# Patient Record
Sex: FEMALE | Race: WHITE | Employment: PART TIME | URBAN - METROPOLITAN AREA
[De-identification: names, ages, dates, MRNs, and addresses within clinical notes are randomized per-mention and may not be internally consistent; named-entity substitution may affect disease eponyms.]

---
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
## 2022-01-04 NOTE — PROGRESS NOTES
Annual exam ages 21-44    Jossy Anderson is a No obstetric history on file. ,  25 y.o. female [address] [facility] [place] Patient's last menstrual period was 12/31/2021., who presents for her annual checkup. Working at [employer]. Applying for job at [employer]. Since her last annual GYN exam about one year ago, she has had the following changes in her health history:   - none    Seen 10/1/21 for dysmenorrhea. US at that visit wnl, was just starting [place]. Discussed trial of Orilissa if not improved. ADDITIONAL CONCERNS:  She is having no significant problems. With regard to the Gardasil vaccine, she is not sure but will check with mom. .      Menstrual status:    Her periods are normally heavy in flow. She just had the first period since being on seasonique and she described it as strange. She had dysmenorrhea. She passed a \"ball of tissue\" and after that, she had no pain. Has picture, looks like decidual cast.    She has premenstrual symptoms. Contraception:    The current method of family planning is abstinence. Sexual history:     She  reports never being sexually active. .        Pap and Mammogram History:    She is not of age for pap smear. The patient has never had a mammogram.    Breast Cancer History/Substance Abuse:    Past Medical History:   Diagnosis Date    Adverse effect of anesthesia     mother is slow to wake, sister became 'hysterical'    Dysmenorrhea 3/1/16    Environmental allergies      Past Surgical History:   Procedure Laterality Date    HX CYST REMOVAL  2019     OB History   No obstetric history on file. Current Outpatient Medications   Medication Sig Dispense Refill    L-Norgest&E Estradiol-E Estrad (Seasonique) 0.15 mg-30 mcg (84)/10 mcg (7) 3MPk Take 1 Tablet by mouth daily. 1 Dose Pack 4     Allergies: Patient has no known allergies.    Social History     Socioeconomic History    Marital status: SINGLE     Spouse name: Not on file    Number of children: Not on file    Years of education: Not on file    Highest education level: Not on file   Occupational History    Not on file   Tobacco Use    Smoking status: Never Smoker    Smokeless tobacco: Never Used   Vaping Use    Vaping Use: Never used   Substance and Sexual Activity    Alcohol use: No    Drug use: No    Sexual activity: Never   Other Topics Concern    Not on file   Social History Narrative    Not on file     Social Determinants of Health     Financial Resource Strain:     Difficulty of Paying Living Expenses: Not on file   Food Insecurity:     Worried About Running Out of Food in the Last Year: Not on file    Tonya of Food in the Last Year: Not on file   Transportation Needs:     Lack of Transportation (Medical): Not on file    Lack of Transportation (Non-Medical): Not on file   Physical Activity:     Days of Exercise per Week: Not on file    Minutes of Exercise per Session: Not on file   Stress:     Feeling of Stress : Not on file   Social Connections:     Frequency of Communication with Friends and Family: Not on file    Frequency of Social Gatherings with Friends and Family: Not on file    Attends Hinduism Services: Not on file    Active Member of [address] [place] [address] or Organizations: Not on file    Attends Club or Organization Meetings: Not on file    Marital Status: Not on file   Intimate Partner Violence:     Fear of Current or Ex-Partner: Not on file    Emotionally Abused: Not on file    Physically Abused: Not on file    Sexually Abused: Not on file   Housing Stability:     Unable to Pay for Housing in the Last Year: Not on file    Number of [place] in the Last Year: Not on file    Unstable Housing in the Last Year: Not on file     Tobacco History:  reports that she has never smoked. She has never used smokeless tobacco.  Alcohol Abuse:  reports no history of alcohol use. Drug Abuse:  reports no history of drug use. There is no problem list on file for this patient.     Family History   Problem Relation Age of Onset    Other Mother         prediabetes    Diabetes Maternal Grandmother     Breast Cancer Paternal Grandmother 79    Pulmonary Embolism Paternal Grandmother         ? PE -- had blood clot, thinks it went to her lungs    Heart Attack Paternal Uncle     Heart Attack Paternal Uncle     Colon Cancer Paternal Grandfather 79       Review of Systems - History obtained from the patient  Constitutional: negative for weight loss, fever, night sweats  HEENT: negative for hearing loss, earache, congestion, snoring, sorethroat  CV: negative for chest pain, palpitations, edema  Resp: negative for cough, shortness of breath, wheezing  GI: negative for change in bowel habits, abdominal pain, black or bloody stools  : negative for frequency, dysuria, hematuria, vaginal discharge  MSK: negative for back pain, joint pain, muscle pain  Breast: negative for breast lumps, nipple discharge, galactorrhea  Skin :negative for itching, rash, hives  Neuro: negative for dizziness, headache, confusion, weakness  Psych: negative for anxiety, depression, change in mood  Heme/lymph: negative for bleeding, bruising, pallor    Physical Exam    Visit Vitals  /77   Pulse 80   Ht 5' 3\" (1.6 m)   Wt 109 lb (49.4 kg)   LMP 12/31/2021   BMI 19.31 kg/m²       Constitutional  · Appearance: well-nourished, well developed, alert, in no acute distress    HENT  · Head and Face: appears normal    Neck  · Inspection/Palpation: normal appearance, no masses or tenderness  · Lymph Nodes: no lymphadenopathy present  · Thyroid: gland size mildly enlarged, nontender, no nodules or masses present on palpation    Chest  · Respiratory Effort: breathing unlabored  · Auscultation: normal breath sounds    Cardiovascular  · Heart:  · Auscultation: regular rate and rhythm without murmur    Breasts  · Inspection of Breasts: breasts symmetrical, no skin changes, no discharge present, nipple appearance normal, no skin retraction present  · Palpation of Breasts and Axillae: no masses present on palpation, no breast tenderness  · Axillary Lymph Nodes: no lymphadenopathy present    Gastrointestinal  · Abdominal Examination: abdomen non-tender to palpation, normal bowel sounds, no masses present  · Liver and spleen: no hepatomegaly present, spleen not palpable  · Hernias: no hernias identified    Genitourinary  [deferred - not yet SA]    Skin  · General Inspection: no rash, no lesions identified    Neurologic/Psychiatric  · Mental Status:  · Orientation: grossly oriented to person, place and time  · Mood and Affect: mood normal, affect appropriate        Assessment & Plan:  · Routine gynecologic examination. Pap @ 22yo. · STI screening <26yo. · Dysmenorrhea, menorrhagia, improved on Seasonique. Passed decidual cast.  Wishes to continue. eRX #1 RFx4   · Mild TM -> TSH, FT4, TPO Ab, thyroid US. · Counseled re: diet, exercise, healthy lifestyle  · Return for yearly wellness visits  · Patient verbalized understanding    Orders Placed This Encounter    US THYROID/PARATHYROID/SOFT TISS     Standing Status:   Future     Standing Expiration Date:   2/6/2023     Order Specific Question:   Is Patient Pregnant? Answer:   Yes     Order Specific Question:   Reason for Exam     Answer:   thyromegaly    TSH 3RD GENERATION     Standing Status:   Future     Standing Expiration Date:   1/6/2023    T4, FREE     Standing Status:   Future     Standing Expiration Date:   1/6/2023    THYROID PEROXIDASE (TPO) AB     Standing Status:   Future     Standing Expiration Date:   1/6/2023    L-Norgest&E Estradiol-E Estrad (Seasonique) 0.15 mg-30 mcg (84)/10 mcg (7) 3MPk     Sig: Take 1 Tablet by mouth daily.      Dispense:  1 Dose Pack     Refill:  4

## 2022-01-06 ENCOUNTER — OFFICE VISIT (OUTPATIENT)
Dept: OBGYN CLINIC | Age: 22
End: 2022-01-06
Payer: COMMERCIAL

## 2022-01-06 VITALS
DIASTOLIC BLOOD PRESSURE: 77 MMHG | SYSTOLIC BLOOD PRESSURE: 126 MMHG | BODY MASS INDEX: 19.31 KG/M2 | WEIGHT: 109 LBS | HEIGHT: 63 IN | HEART RATE: 80 BPM

## 2022-01-06 DIAGNOSIS — N94.6 DYSMENORRHEA: ICD-10-CM

## 2022-01-06 DIAGNOSIS — E01.0 THYROMEGALY: ICD-10-CM

## 2022-01-06 DIAGNOSIS — Z01.419 ENCOUNTER FOR GYNECOLOGICAL EXAMINATION WITHOUT ABNORMAL FINDING: Primary | ICD-10-CM

## 2022-01-06 DIAGNOSIS — Z30.41 ENCOUNTER FOR SURVEILLANCE OF CONTRACEPTIVE PILLS: ICD-10-CM

## 2022-01-06 DIAGNOSIS — N92.0 MENORRHAGIA WITH REGULAR CYCLE: ICD-10-CM

## 2022-01-06 PROCEDURE — 99395 PREV VISIT EST AGE 18-39: CPT | Performed by: OBSTETRICS & GYNECOLOGY

## 2022-01-06 RX ORDER — LEVONORGESTREL / ETHINYL ESTRADIOL AND ETHINYL ESTRADIOL 150-30(84)
1 KIT ORAL DAILY
Qty: 1 DOSE PACK | Refills: 4 | Status: SHIPPED | OUTPATIENT
Start: 2022-01-06 | End: 2022-03-25 | Stop reason: SDUPTHER

## 2022-01-07 LAB
T4 FREE SERPL-MCNC: 1.1 NG/DL (ref 0.8–1.5)
TSH SERPL DL<=0.05 MIU/L-ACNC: 1.87 UIU/ML (ref 0.36–3.74)

## 2022-01-08 LAB — THYROPEROXIDASE AB SERPL-ACNC: <8 IU/ML (ref 0–26)

## 2023-06-26 RX ORDER — LEVONORGESTREL AND ETHINYL ESTRADIOL AND ETHINYL ESTRADIOL 150-30(84)
KIT ORAL
Qty: 273 TABLET | OUTPATIENT
Start: 2023-06-26

## 2023-06-27 ENCOUNTER — TELEPHONE (OUTPATIENT)
Age: 23
End: 2023-06-27

## 2023-06-27 RX ORDER — LEVONORGESTREL / ETHINYL ESTRADIOL AND ETHINYL ESTRADIOL 150-30(84)
1 KIT ORAL DAILY
Qty: 1 PACKET | Refills: 0 | Status: SHIPPED | OUTPATIENT
Start: 2023-06-27

## 2023-07-18 ENCOUNTER — OFFICE VISIT (OUTPATIENT)
Age: 23
End: 2023-07-18
Payer: COMMERCIAL

## 2023-07-18 VITALS
BODY MASS INDEX: 23.91 KG/M2 | WEIGHT: 135 LBS | DIASTOLIC BLOOD PRESSURE: 82 MMHG | SYSTOLIC BLOOD PRESSURE: 115 MMHG | HEART RATE: 76 BPM

## 2023-07-18 DIAGNOSIS — N94.6 DYSMENORRHEA, UNSPECIFIED: ICD-10-CM

## 2023-07-18 DIAGNOSIS — Z12.4 PAP SMEAR FOR CERVICAL CANCER SCREENING: ICD-10-CM

## 2023-07-18 DIAGNOSIS — Z01.419 ENCOUNTER FOR WELL WOMAN EXAM: Primary | ICD-10-CM

## 2023-07-18 DIAGNOSIS — Z11.3 SCREEN FOR STD (SEXUALLY TRANSMITTED DISEASE): ICD-10-CM

## 2023-07-18 DIAGNOSIS — N94.819 VULVODYNIA: ICD-10-CM

## 2023-07-18 DIAGNOSIS — N92.0 EXCESSIVE AND FREQUENT MENSTRUATION WITH REGULAR CYCLE: ICD-10-CM

## 2023-07-18 PROCEDURE — 99395 PREV VISIT EST AGE 18-39: CPT | Performed by: OBSTETRICS & GYNECOLOGY

## 2023-07-18 RX ORDER — LEVONORGESTREL / ETHINYL ESTRADIOL AND ETHINYL ESTRADIOL 150-30(84)
1 KIT ORAL DAILY
Qty: 1 PACKET | Refills: 4 | Status: SHIPPED | OUTPATIENT
Start: 2023-07-18

## 2023-07-18 RX ORDER — DULOXETIN HYDROCHLORIDE 20 MG/1
20 CAPSULE, DELAYED RELEASE ORAL DAILY
Qty: 90 CAPSULE | Refills: 1 | Status: SHIPPED | OUTPATIENT
Start: 2023-07-18

## 2023-07-18 NOTE — PROGRESS NOTES
Alvina Borrego is a 26 y.o. female returns for an annual exam     Chief Complaint   Patient presents with    Annual Exam       Patient's last menstrual period was 07/01/2023. Her periods are moderate in flow and usually regular with a 26-32 day interval with 3-7 day duration. She does not have dysmenorrhea. Problems: problems - attempting intercourse and removing tampons very painful  Birth Control: OCP (estrogen/progesterone) and condoms . Today will be her first pap smear.   With regard to the Gardisil vaccine, she has received all 3 injections
tender at vestibule, no inflammatory lesions present, no masses present, no atrophy present          Vagina: normal vaginal vault, no discharge present, no inflammatory lesions present, no masses present  Bladder: non-tender to palpation  Urethra: appears normal  Cervix: normal   Uterus: normal size, shape and consistency  Adnexa: no adnexal tenderness present, no adnexal masses present  Perineum: perineum within normal limits, no evidence of trauma, no rashes or skin lesions present  Anus: anus within normal limits, no hemorrhoids present  Inguinal Lymph Nodes: no lymphadenopathy present    Skin  General Inspection: no rash, no lesions identified    Neurologic/Psychiatric  Mental Status:  Orientation: grossly oriented to person, place and time  Mood and Affect: mood normal, affect appropriate    Assessment & Plan:  Routine gynecologic examination. Pap today  STI screening <26yo. Dysmenorrhea, menorrhagia. Improved on  Seasonique. 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 RTO 2-3 months to reassess, can add topical lidocaine if needed. Counseled re: diet, exercise, healthy lifestyle  Return for yearly wellness visits  Patient verbalized understanding        Orders Placed This Encounter    Chlamydia, Gonorrhea, Trichomoniasis     Standing Status:   Future     Number of Occurrences:   1     Standing Expiration Date:   7/18/2024    Pap Lb, rfx HPV ASCU     Standing Status:   Future     Number of Occurrences:   1     Standing Expiration Date:   7/18/2024     Order Specific Question:   Pap Source? (Required)     Answer:   CERVIX     Order Specific Question:   Pap Source? (Required)     Answer:   ENDOMETRIUM     Order Specific Question:   Pap collection method? (Required     Answer:   brush     Order Specific Question:   Pap collection method? (Required     Answer:   spatula     Order Specific Question:   Menstrual Status ?      Answer:   Having periods [5]     Order

## 2023-07-20 ENCOUNTER — TELEPHONE (OUTPATIENT)
Age: 23
End: 2023-07-20

## 2023-07-20 DIAGNOSIS — E01.0 THYROMEGALY: Primary | ICD-10-CM

## 2023-07-20 NOTE — TELEPHONE ENCOUNTER
Two patient identifiers used      26year old patient last seen in the office on 7/18/2023 for ae    Patient reports 2022 she had thyroid labs done and ultrasound of thyroid order and she did not get it done    Patient is now wanting to get the ultrasound and needs an order       Assessment & Plan:  Routine gynecologic examination. Pap @ 22yo. STI screening <26yo. Dysmenorrhea, menorrhagia, improved on Seasonique. Passed decidual cast.  Wishes to continue. eRX #1 RFx4   Mild TM -> TSH, FT4, TPO Ab, thyroid US.   Counseled re: diet, exercise, healthy lifestyle  Return for yearly wellness visits  Patient verbalized understanding  Ultrasound pended for amend/sign    Please advise    Thank you

## 2023-07-21 LAB
., LABCORP: NORMAL
CYTOLOGIST CVX/VAG CYTO: NORMAL
CYTOLOGY CVX/VAG DOC CYTO: NORMAL
CYTOLOGY CVX/VAG DOC THIN PREP: NORMAL
DX ICD CODE: NORMAL
Lab: NORMAL
OTHER STN SPEC: NORMAL
STAT OF ADQ CVX/VAG CYTO-IMP: NORMAL

## 2023-07-21 NOTE — TELEPHONE ENCOUNTER
This nurse attempted to reach the patient and left a detailed message for the patient that MD has signed the order for her thyroid  ultrasound      Patient was sent a my chart message as well.

## 2023-07-23 LAB
C TRACH RRNA SPEC QL NAA+PROBE: NEGATIVE
N GONORRHOEA RRNA SPEC QL NAA+PROBE: NEGATIVE
T VAGINALIS RRNA SPEC QL NAA+PROBE: NEGATIVE

## 2023-08-04 ENCOUNTER — HOSPITAL ENCOUNTER (OUTPATIENT)
Facility: HOSPITAL | Age: 23
Discharge: HOME | End: 2023-08-04
Attending: OBSTETRICS & GYNECOLOGY
Payer: COMMERCIAL

## 2023-08-04 DIAGNOSIS — E01.0 THYROMEGALY: ICD-10-CM

## 2023-08-04 PROCEDURE — 76536 US EXAM OF HEAD AND NECK: CPT

## 2023-10-18 NOTE — PROGRESS NOTES
Shawn Morillo is a 26 y.o. female presents for a problem visit. Chief Complaint   Patient presents with    vulvodynia     Patient's last menstrual period was 09/28/2023. Birth Control: OCP (estrogen/progesterone). Last Pap: normal obtained 7/2023    The patient is here today to follow up for her vulvodynia. She reports that nothing has changed. She used the lidocaine once and said she could not feel a difference. She reports that the stinging and burning is still present.

## 2023-10-19 ENCOUNTER — OFFICE VISIT (OUTPATIENT)
Age: 23
End: 2023-10-19
Payer: COMMERCIAL

## 2023-10-19 VITALS
HEART RATE: 91 BPM | BODY MASS INDEX: 24.27 KG/M2 | WEIGHT: 137 LBS | SYSTOLIC BLOOD PRESSURE: 124 MMHG | DIASTOLIC BLOOD PRESSURE: 83 MMHG

## 2023-10-19 DIAGNOSIS — N94.819 VULVODYNIA: Primary | ICD-10-CM

## 2023-10-19 DIAGNOSIS — M62.89 PELVIC FLOOR TENSION: ICD-10-CM

## 2023-10-19 DIAGNOSIS — N94.810 VULVAR VESTIBULITIS: ICD-10-CM

## 2023-10-19 PROCEDURE — 99214 OFFICE O/P EST MOD 30 MIN: CPT | Performed by: OBSTETRICS & GYNECOLOGY

## 2023-10-19 RX ORDER — DULOXETIN HYDROCHLORIDE 30 MG/1
30 CAPSULE, DELAYED RELEASE ORAL DAILY
Qty: 90 CAPSULE | Refills: 1 | Status: SHIPPED | OUTPATIENT
Start: 2023-10-19

## 2023-10-19 RX ORDER — ESTRADIOL 0.1 MG/G
CREAM VAGINAL
Qty: 42.5 G | Refills: 3 | Status: SHIPPED | OUTPATIENT
Start: 2023-10-19

## 2023-10-19 NOTE — PROGRESS NOTES
OB/GYN Problem Visit        HPI  Sheryl Chang is a No obstetric history on file. ,  26 y.o. female who presents for a problem visit. Patient's last menstrual period was 2023. LV=23 AE    Vulvodynia/vestibulitis. Was prescribed low dose cymbalta. Subsequently also prescribed topical lidocaine. Tried the lidocaine once with IC, did not help. Does not think the Cymbalta has helped  Wondering about pelvic floor PT    Is able to insert tampons (tolerates smooth applicator), but has pain with removal.            Past Medical History:   Diagnosis Date    Adverse effect of anesthesia     mother is slow to wake, sister became 'hysterical'    Depression 2019    Drug effect     Allergic to prednisone    Dysmenorrhea 3/1/16    Environmental allergies     HPV vaccine counseling     completed series    Routine Papanicolaou smear 2023    normal     Past Surgical History:   Procedure Laterality Date    PILONIDAL CYST EXCISION  2019    WISDOM TOOTH EXTRACTION  2023     OB History    Para Term  AB Living   0 0 0 0 0 0   SAB IAB Ectopic Molar Multiple Live Births   0 0 0 0 0 0       Social History     Occupational History    Not on file   Tobacco Use    Smoking status: Never    Smokeless tobacco: Never   Vaping Use    Vaping Use: Never used   Substance and Sexual Activity    Alcohol use: No    Drug use: No    Sexual activity: Yes     Partners: Male     Birth control/protection: OCP, Condom     Comment: is attempting     Family History   Problem Relation Age of Onset    Colon Cancer Paternal Grandfather 79    Heart Attack Paternal Uncle     Other Mother         prediabetes    Diabetes Mother         Pre diabetes    Diabetes Maternal Grandmother     Mental Illness Maternal Grandmother         Schizophrenia    Stroke Maternal Grandmother     Breast Cancer Paternal Grandmother 79    Pulmonary Embolism Paternal Grandmother         ? PE -- had blood clot, thinks it went to her lungs

## 2023-11-08 ENCOUNTER — HOSPITAL ENCOUNTER (OUTPATIENT)
Facility: HOSPITAL | Age: 23
Setting detail: RECURRING SERIES
Discharge: HOME | End: 2023-11-11
Payer: COMMERCIAL

## 2023-11-08 PROCEDURE — 97535 SELF CARE MNGMENT TRAINING: CPT

## 2023-11-08 PROCEDURE — 97162 PT EVAL MOD COMPLEX 30 MIN: CPT

## 2023-11-08 PROCEDURE — 97112 NEUROMUSCULAR REEDUCATION: CPT

## 2023-11-08 NOTE — THERAPY EVALUATION
Physical Therapy at [facility],   a part of [address] [place] [address], [address] [facility], [facility] [address]  Phone: [phone]  Fax: [phone]     PHYSICAL THERAPY - EVALUATION/PLAN OF CARE NOTE (updated 3/23)    Date: 2023        Patient Name:  Agus Gee :  2000   Medical   Diagnosis:  Vulvodynia [N94.819]  Vulvar vestibulitis [N94.810]  Pelvic floor tension [M62.89] Treatment Diagnosis:  N90.89  OTHER SPECIFIED NONINFLAMMATORY DISORDERS OF VULVA AND PERINEUM and R39.89  Other signs and symptoms of genitourinary system    Referral Source:  Aniket Cuello MD Provider #:  [id number]                Insurance: Payor: Richard Coronel / Plan: Richard Fears / Product Type: *No Product type* /      Patient  verified yes     Visit #   Current  / Total 1 30   Time   In / Out 845a 945a   Total Treatment Time 60   Total Timed Codes 30     SUBJECTIVE  Pain Level (0-10 scale): 0/10 at rest, 7/10 at worst  []constant []intermittent []improving []worsening []no change since onset    Any medication changes, allergies to medications, adverse drug reactions, diagnosis change, or new procedure performed?: [x] No    [] Yes (see summary sheet for update)  Medications: Verified on Patient Summary List    Subjective functional status/changes:       Vaginal penetration has always been painful- tampons (out more than in), speculums, IC. Oral sex and masturbation are not a problem. Unable to have complete IC. No problems with jeans or certain textures. \"Its almost like there's a wall\"    Sometimes feels like she cant fully empty her bladder. Previously has had trouble holding in urine with laughing but not in a couple years. Has BMs every couple days. Some sharp pains with BMs. Reports type 3 stools. Reports straining about 70% of the time. Drinks a glass and a half of water, cranberry juice, lemonade. Eats cereal, small snacks for lunch, takeout for dinner.

## 2023-11-27 ENCOUNTER — HOSPITAL ENCOUNTER (OUTPATIENT)
Facility: HOSPITAL | Age: 23
Setting detail: RECURRING SERIES
Discharge: HOME | End: 2023-11-30
Payer: COMMERCIAL

## 2023-11-27 PROCEDURE — 97140 MANUAL THERAPY 1/> REGIONS: CPT

## 2023-11-27 PROCEDURE — 97110 THERAPEUTIC EXERCISES: CPT

## 2023-11-27 PROCEDURE — 97112 NEUROMUSCULAR REEDUCATION: CPT

## 2023-11-27 NOTE — PROGRESS NOTES
PHYSICAL THERAPY - DAILY TREATMENT NOTE (updated 3/23)    Date: 2023        Patient Name:  Redd Wakefield :  2000   Medical   Diagnosis:  Vulvodynia [N94.819]  Vulvar vestibulitis [N94.810]  Pelvic floor tension [M62.89] Treatment Diagnosis:  N90.89  OTHER SPECIFIED NONINFLAMMATORY DISORDERS OF VULVA AND PERINEUM and R39.89  Other signs and symptoms of genitourinary system    Referral Source:  Cony St MD Insurance:   Payor: Ak[employer] / Plan: Nabil Grills / Product Type: *No Product type* /                   Patient  verified yes     Visit #   Current  / Total 2 30   Time   In / Out 708a 747a   Total Treatment Time 39   Total Timed Codes 39     SUBJECTIVE    Pain Level (0-10 scale): 0    Any medication changes, allergies to medications, adverse drug reactions, diagnosis change, or new procedure performed?: [x] No    [] Yes (see summary sheet for update)  Medications: Verified on Patient Summary List    Subjective functional status/changes:       Trying to increase fiber and water but didn't have as much time with moving homes. Last couple days had more frequent BMs and squatty potty helping. BMs are sometimes easier bowel massage \"felt like it moved some stuff around\". Leaning forward to pee. Used level 4 dilator before IC. Had successful IC with 5/10 pain. Tried check ins but not sure if she's doing it right. OBJECTIVE    Therapeutic Procedures: Tx Min Billable or 1:1 Min (if diff from Tx Min) Procedure, Rationale, Specifics   15  72380 Manual Therapy (timed):  decrease pain and increase tissue extensibility to improve patient's ability to progress to PLOF and address remaining functional goals. The manual therapy interventions were performed at a separate and distinct time from the therapeutic activities interventions .  (see flow sheet as applicable)     Details if applicable:     14  12456 Therapeutic Exercise (timed):  increase ROM, strength, coordination, balance, and

## 2023-12-04 ENCOUNTER — HOSPITAL ENCOUNTER (OUTPATIENT)
Facility: HOSPITAL | Age: 23
Setting detail: RECURRING SERIES
Discharge: HOME | End: 2023-12-07
Payer: COMMERCIAL

## 2023-12-04 PROCEDURE — 97140 MANUAL THERAPY 1/> REGIONS: CPT

## 2023-12-04 PROCEDURE — 97112 NEUROMUSCULAR REEDUCATION: CPT

## 2023-12-04 NOTE — PROGRESS NOTES
PHYSICAL THERAPY - DAILY TREATMENT NOTE (updated 3/23)    Date: 2023        Patient Name:  Shawn Morillo :  2000   Medical   Diagnosis:  Vulvodynia [N94.819]  Vulvar vestibulitis [N94.810]  Pelvic floor tension [M62.89] Treatment Diagnosis:  N90.89  OTHER SPECIFIED NONINFLAMMATORY DISORDERS OF VULVA AND PERINEUM and R39.89  Other signs and symptoms of genitourinary system    Referral Source:  Lisa Joshi MD Insurance:   Payor: Rafaela Layton / Plan: Rafaela Layton / Product Type: *No Product type* /                   Patient  verified yes     Visit #   Current  / Total 3 30   Time   In / Out 706a 750a   Total Treatment Time 44   Total Timed Codes 44     SUBJECTIVE    Pain Level (0-10 scale): 0    Any medication changes, allergies to medications, adverse drug reactions, diagnosis change, or new procedure performed?: [x] No    [] Yes (see summary sheet for update)  Medications: Verified on Patient Summary List    Subjective functional status/changes:       Had some discomfort with IC since last visit then felt better (2/10), then with changing positions burning pain (8/10). Decreased over 30 minute time frame. Used level 4 dilator before IC. Trying to increase fiber and water. Feels like BMs are more frequent and easier hasn't really had to strain. OBJECTIVE    Therapeutic Procedures: Tx Min Billable or 1:1 Min (if diff from Tx Min) Procedure, Rationale, Specifics   34  61062 Manual Therapy (timed):  decrease pain and increase tissue extensibility to improve patient's ability to progress to PLOF and address remaining functional goals. The manual therapy interventions were performed at a separate and distinct time from the therapeutic activities interventions .  (see flow sheet as applicable)     Details if applicable: pelvic exam, sustained hold R OI and layer 1 throughout     29840 Therapeutic Exercise (timed):  increase ROM, strength, coordination, balance, and proprioception to improve

## 2023-12-11 ENCOUNTER — HOSPITAL ENCOUNTER (OUTPATIENT)
Facility: HOSPITAL | Age: 23
Setting detail: RECURRING SERIES
Discharge: HOME | End: 2023-12-14
Payer: COMMERCIAL

## 2023-12-11 PROCEDURE — 97110 THERAPEUTIC EXERCISES: CPT

## 2023-12-11 PROCEDURE — 97112 NEUROMUSCULAR REEDUCATION: CPT

## 2023-12-11 PROCEDURE — 97535 SELF CARE MNGMENT TRAINING: CPT

## 2023-12-11 NOTE — PROGRESS NOTES
PHYSICAL THERAPY - DAILY TREATMENT NOTE (updated 3/23)    Date: 2023        Patient Name:  Rosario Alvarado :  2000   Medical   Diagnosis:  Vulvodynia [N94.819]  Vulvar vestibulitis [N94.810]  Pelvic floor tension [M62.89] Treatment Diagnosis:  N90.89  OTHER SPECIFIED NONINFLAMMATORY DISORDERS OF VULVA AND PERINEUM and R39.89  Other signs and symptoms of genitourinary system    Referral Source:  Nancy Eduardo MD Insurance:   Payor: [employer] / Plan: [employer] / Product Type: *No Product type* /                   Patient  verified yes     Visit #   Current  / Total 4 30   Time   In / Out 713a 621n   Total Treatment Time 32   Total Timed Codes 32     SUBJECTIVE    Pain Level (0-10 scale): 0    Any medication changes, allergies to medications, adverse drug reactions, diagnosis change, or new procedure performed?: [x] No    [] Yes (see summary sheet for update)  Medications: Verified on Patient Summary List    Subjective functional status/changes:       Has not attempted IC since last visit but no soreness after last visit. No problems with BMs since last visit. OBJECTIVE    Therapeutic Procedures: Tx Min Billable or 1:1 Min (if diff from Tx Min) Procedure, Rationale, Specifics     92150 Manual Therapy (timed):  decrease pain and increase tissue extensibility to improve patient's ability to progress to PLOF and address remaining functional goals. The manual therapy interventions were performed at a separate and distinct time from the therapeutic activities interventions . (see flow sheet as applicable)     Details if applicable:    8  74834 Therapeutic Exercise (timed):  increase ROM, strength, coordination, balance, and proprioception to improve patient's ability to progress to PLOF and address remaining functional goals.  (see flow sheet as applicable)     Details if applicable:     10  84546 Neuromuscular Re-Education (timed):  improve balance, coordination, kinesthetic sense,

## 2023-12-18 ENCOUNTER — APPOINTMENT (OUTPATIENT)
Facility: HOSPITAL | Age: 23
End: 2023-12-18
Payer: COMMERCIAL

## 2024-01-05 RX ORDER — DULOXETIN HYDROCHLORIDE 20 MG/1
20 CAPSULE, DELAYED RELEASE ORAL DAILY
Qty: 90 CAPSULE | Refills: 1 | OUTPATIENT
Start: 2024-01-05

## 2024-01-08 ENCOUNTER — HOSPITAL ENCOUNTER (OUTPATIENT)
Facility: HOSPITAL | Age: 24
Setting detail: RECURRING SERIES
Discharge: HOME | End: 2024-01-11
Payer: COMMERCIAL

## 2024-01-08 PROCEDURE — 97112 NEUROMUSCULAR REEDUCATION: CPT

## 2024-01-08 PROCEDURE — 97535 SELF CARE MNGMENT TRAINING: CPT

## 2024-01-08 NOTE — PROGRESS NOTES
PHYSICAL THERAPY - DAILY TREATMENT NOTE (updated 3/23)    Date: 2024        Patient Name:  Sheryl Chang :  2000   Medical   Diagnosis:  Vulvodynia [N94.819]  Vulvar vestibulitis [N94.810]  Pelvic floor tension [M62.89] Treatment Diagnosis:  N90.89  OTHER SPECIFIED NONINFLAMMATORY DISORDERS OF VULVA AND PERINEUM and R39.89  Other signs and symptoms of genitourinary system    Referral Source:  Jaimee Abbott MD Insurance:   Payor: [employer] / Plan: [employer] / Product Type: *No Product type* /                   Patient  verified yes     Visit #   Current  / Total 5 30   Time   In / Out 711a 749a   Total Treatment Time 38   Total Timed Codes 38     SUBJECTIVE    Pain Level (0-10 scale): 0    Any medication changes, allergies to medications, adverse drug reactions, diagnosis change, or new procedure performed?: [x] No    [] Yes (see summary sheet for update)  Medications: Verified on Patient Summary List    Subjective functional status/changes:       Made it to size 4 dilator with 6/10 pain. Had IC on multiple occasions and able to switch positions. Used dilator before. A little pain in the beginning (1/10) with minimal stinging then followed afterwards for about 10 minutes. Stopped estrogen around giovany. Able to do breathing and stretching a couple times per week.     OBJECTIVE    Therapeutic Procedures:  Tx Min Billable or 1:1 Min (if diff from Tx Min) Procedure, Rationale, Specifics     31282 Manual Therapy (timed):  decrease pain and increase tissue extensibility to improve patient's ability to progress to PLOF and address remaining functional goals.  The manual therapy interventions were performed at a separate and distinct time from the therapeutic activities interventions . (see flow sheet as applicable)     Details if applicable:      61870 Therapeutic Exercise (timed):  increase ROM, strength, coordination, balance, and proprioception to improve patient's ability to progress

## 2024-01-22 ENCOUNTER — HOSPITAL ENCOUNTER (OUTPATIENT)
Facility: HOSPITAL | Age: 24
Setting detail: RECURRING SERIES
Discharge: HOME | End: 2024-01-25
Payer: COMMERCIAL

## 2024-01-22 PROCEDURE — 97535 SELF CARE MNGMENT TRAINING: CPT

## 2024-01-22 PROCEDURE — 97140 MANUAL THERAPY 1/> REGIONS: CPT

## 2024-01-22 NOTE — PROGRESS NOTES
PHYSICAL THERAPY - DAILY TREATMENT NOTE (updated 3/23)    Date: 2024        Patient Name:  Sheryl Chang :  2000   Medical   Diagnosis:  Vulvodynia [N94.819]  Vulvar vestibulitis [N94.810]  Pelvic floor tension [M62.89] Treatment Diagnosis:  N90.89  OTHER SPECIFIED NONINFLAMMATORY DISORDERS OF VULVA AND PERINEUM and R39.89  Other signs and symptoms of genitourinary system    Referral Source:  Jaimee Abbott MD Insurance:   Payor: [employer] / Plan: [employer] - CHOICE PLU / Product Type: *No Product type* /                   Patient  verified yes     Visit #   Current  / Total 6 30   Time   In / Out 704a 751a   Total Treatment Time 47   Total Timed Codes 47     SUBJECTIVE    Pain Level (0-10 scale): 0    Any medication changes, allergies to medications, adverse drug reactions, diagnosis change, or new procedure performed?: [x] No    [] Yes (see summary sheet for update)  Medications: Verified on Patient Summary List    Subjective functional status/changes:       Sees an improvement with sex overall but wants to try without numbing cream and Cymbalta. Has not had IC since last visit and has had a hard time doing dilators. Still on size 4 dilator and it feels the same.     OBJECTIVE    Therapeutic Procedures:  Tx Min Billable or 1:1 Min (if diff from Tx Min) Procedure, Rationale, Specifics     32  93524 Manual Therapy (timed):  decrease pain and increase tissue extensibility to improve patient's ability to progress to PLOF and address remaining functional goals.  The manual therapy interventions were performed at a separate and distinct time from the therapeutic activities interventions . (see flow sheet as applicable)     Details if applicable: size 4 IR dilator, R/L stretch, diagonals, sweeping x 30 sec e     59440 Therapeutic Exercise (timed):  increase ROM, strength, coordination, balance, and proprioception to improve patient's ability to progress to PLOF and address remaining

## 2024-01-29 ENCOUNTER — APPOINTMENT (OUTPATIENT)
Facility: HOSPITAL | Age: 24
End: 2024-01-29
Payer: COMMERCIAL

## 2024-07-18 NOTE — PROGRESS NOTES
Sheryl Chang is a 24 y.o. female returns for an annual exam     Chief Complaint   Patient presents with    Annual Exam       Patient's last menstrual period was 07/01/2024.  Her periods are light in flow and  comes every 3 months .  She does not have dysmenorrhea.  Problems: no problems  Birth Control: OCP (estrogen/progesterone).  Last Pap: normal obtained 7/18/23  She does not have a history of MAEVE 2, 3 or cervical cancer.   With regard to the Gardisil vaccine, she Completed series    
on OCPs  She does nothave premenstrual symptoms.      Contraception:    The current method of family planning is Seasonique.      Sexual history:    She  reports being sexually active and has had partner(s) who are male. She reports using the following methods of birth control/protection: OCP and Condom.      Pap Smear:  Her most recent Pap smear was normal, HPV was not tested, obtained 23.    She does not  have a history of abnormal paps.           Medical conditions:    Since her last annual GYN exam about 1 year(s) ago, she has had the following changes in her health history:  - \"bump\" on neck, ?LN -- scheduled for US      Past Medical History:   Diagnosis Date    Adverse effect of anesthesia     mother is slow to wake, sister became 'hysterical'    Depression     Drug effect     Allergic to prednisone    Dysmenorrhea 3/1/16    Environmental allergies     HPV vaccine counseling     completed series    Routine Papanicolaou smear 2023    normal     Past Surgical History:   Procedure Laterality Date    PILONIDAL CYST EXCISION  2019    WISDOM TOOTH EXTRACTION  2023     OB History    Para Term  AB Living   0 0 0 0 0 0   SAB IAB Ectopic Molar Multiple Live Births   0 0 0 0 0 0         Current Outpatient Medications   Medication Sig Dispense Refill    Levonorgest-Eth Estrad 91-Day 0.15-0.03 &0.01 MG TABS Take 1 tablet by mouth daily 1 packet 4    lidocaine (XYLOCAINE) 5 % ointment Apply small amount topically to affected area as needed for pain 4x/d. 30 g 1     No current facility-administered medications for this visit.     Allergies: Prednisone     Tobacco History:  reports that she has never smoked. She has never used smokeless tobacco.  Alcohol Abuse:  reports no history of alcohol use.  Drug Abuse:  reports no history of drug use.    Family Medical/Cancer History:   Family History   Problem Relation Age of Onset    Colon Cancer Paternal Grandfather 70    Heart Attack Paternal

## 2024-07-19 ENCOUNTER — OFFICE VISIT (OUTPATIENT)
Age: 24
End: 2024-07-19
Payer: COMMERCIAL

## 2024-07-19 ENCOUNTER — HOSPITAL ENCOUNTER (OUTPATIENT)
Facility: HOSPITAL | Age: 24
Discharge: HOME | End: 2024-07-19
Payer: COMMERCIAL

## 2024-07-19 VITALS
HEIGHT: 63 IN | SYSTOLIC BLOOD PRESSURE: 132 MMHG | BODY MASS INDEX: 25.34 KG/M2 | HEART RATE: 103 BPM | WEIGHT: 143 LBS | DIASTOLIC BLOOD PRESSURE: 79 MMHG

## 2024-07-19 DIAGNOSIS — Z01.419 ENCOUNTER FOR WELL WOMAN EXAM WITH ROUTINE GYNECOLOGICAL EXAM: Primary | ICD-10-CM

## 2024-07-19 DIAGNOSIS — R59.0 CERVICAL LYMPHADENOPATHY: ICD-10-CM

## 2024-07-19 DIAGNOSIS — Z11.3 SCREEN FOR STD (SEXUALLY TRANSMITTED DISEASE): ICD-10-CM

## 2024-07-19 PROCEDURE — 76536 US EXAM OF HEAD AND NECK: CPT

## 2024-07-19 PROCEDURE — 99395 PREV VISIT EST AGE 18-39: CPT | Performed by: OBSTETRICS & GYNECOLOGY

## 2024-07-19 RX ORDER — LEVONORGESTREL / ETHINYL ESTRADIOL AND ETHINYL ESTRADIOL 150-30(84)
1 KIT ORAL DAILY
Qty: 1 PACKET | Refills: 4 | Status: SHIPPED | OUTPATIENT
Start: 2024-07-19

## 2024-07-19 RX ORDER — LIDOCAINE 50 MG/G
OINTMENT TOPICAL
Qty: 30 G | Refills: 1 | Status: SHIPPED | OUTPATIENT
Start: 2024-07-19

## 2024-12-27 ENCOUNTER — TELEPHONE (OUTPATIENT)
Age: 24
End: 2024-12-27

## 2024-12-27 NOTE — TELEPHONE ENCOUNTER
Xander Salazar MD   to Me       12/27/24  3:25 PM  I would go with taking a break for 4 days and then starting the new pack.    Patient advised of work in Dr Marie GUDINO recommendations and verbalized understanding.

## 2024-12-27 NOTE — TELEPHONE ENCOUNTER
Two patient identifiers used    24 year old patient last seen in the office on 7/19/2024 ashley Chang   to [facility] Clinical Staff (supporting Jaimee Abbott MD)   AB      12/26/24 11:50 AM  Good Morning,     I am experiencing prolonged bleeding most likely due to some missed pills. I had a consistent routine but had some issues remembering to take my nightly pill in October. I followed the prescription instructions and took them once I remembered. I’ve had some breakthrough bleeding on and off for over a month and starting 12/14 I’ve had my period and been bleeding consistently. I’ve started the last week of my 3 month pack, the reduced estrogen pills on 12/22 and will be starting a new pack on 12/29. My question is, should I continue with the new pack as normal and see if the bleeding stops or should I take a small break after this weeks reduced pills? Thank you!        Patient reports missing pills in both October and November and currently has been having bright red vaginal bleeding that she is changing a pad every few hours.    Patient states she is not pregnant.    Patient is currently on the placebo pills and is wondering how to proceed    1) start new package as scheduled   2) or take a break from the pills and do a restart ( ? When)        FW patient     Please advise    Thank you

## 2025-02-04 ENCOUNTER — OFFICE VISIT (OUTPATIENT)
Age: 25
End: 2025-02-04

## 2025-02-04 VITALS
RESPIRATION RATE: 24 BRPM | TEMPERATURE: 99.3 F | HEIGHT: 64 IN | WEIGHT: 120 LBS | OXYGEN SATURATION: 96 % | HEART RATE: 107 BPM | SYSTOLIC BLOOD PRESSURE: 122 MMHG | DIASTOLIC BLOOD PRESSURE: 84 MMHG | BODY MASS INDEX: 20.49 KG/M2

## 2025-02-04 DIAGNOSIS — R52 GENERALIZED BODY ACHES: Primary | ICD-10-CM

## 2025-02-04 DIAGNOSIS — U07.1 COVID-19: ICD-10-CM

## 2025-02-04 LAB
INFLUENZA A ANTIGEN, POC: NEGATIVE
INFLUENZA B ANTIGEN, POC: NEGATIVE
Lab: ABNORMAL
PERFORMING INSTRUMENT: ABNORMAL
QC PASS/FAIL: ABNORMAL
SARS-COV-2, POC: DETECTED

## 2025-02-04 ASSESSMENT — ENCOUNTER SYMPTOMS
COUGH: 1
RHINORRHEA: 1

## 2025-02-04 NOTE — PATIENT INSTRUCTIONS
Isolate at least until fever-free for 24 hours without taking medication (Tylenol/Ibuprofen) and you are feeling better.  Please mask at least 10 days from start of symptoms.    Thank you for choosing [facility].  I hope you feel better soon!     Please follow up with your primary care provider within 2-5 days if your signs and symptoms have not resolved or worsened.     Please go immediately to the Emergency Department if you develop shortness of breath, chest pain and uncontrollable fever above 100.4F.     Nasal Congestion:  Flonase (over the counter) nasal spray, once a day  Saline irrigation kits help wash out sinuses 1-2 times a day  Normal saline nasal spray  Afrin nasal spray         Cough:  Throat lozenges, hot tea, and honey may help  Vicks VapoRub at night to help with cough and relieve muscles aches and pain  If not prescribed a cough medication, Delsym is an option.  It is an over the counter cough medication containing dextromethorphan to help suppress cough at night              If you have high blood pressure, take Coricidin HBP (or the generic form) instead.  Follow instructions on the box.     Congestion:  For thick mucus, take Mucinex (with Guafenesin only) to help thin the mucus.  Follow instructions on the box.  You will need to drink plenty of water with this medication.     Sore Throat:  Lozenges, as needed. Cepacol lozenges will help numb the throat  Chloraseptic spray also helps to numb throat pain  Salt water gargles to soothe throat pain     Sinus pain/pressure:  Warm, wet towel on face to help with facial sinus pain/pressure  Neti Pot or Saline Rinse can be helpful      Headache/Pain Fever/Body Aches:  Alternate every 4 hours:    Tylenol up to 1000mg  with Ibuprofen up to 800mg    (Do not take Tylenol or Ibuprofen more frequently then every 8 hours)         Miscellanous:  Zyrtec/Xyzal/Allegra/Claritin during the day or Benadryl at night may help with

## 2025-02-04 NOTE — PROGRESS NOTES
2025   Sheryl Chang (: 2000) is a 24 y.o. female, New patient, here for evaluation of the following chief complaint(s):  Headache (Pt c/o symptoms started yesterday morning.), Generalized Body Aches, Pharyngitis, and Fever       ASSESSMENT/PLAN:  Below is the assessment and plan developed based on review of pertinent history, physical exam, labs, studies, and medications.  1. Generalized body aches  -     POCT Influenza A/B Antigen  -     POCT COVID-19, Antigen  2. COVID-19     Reviewed CDC guidelines for COVID      Handout given with care instructions  Pt with stable VS, non-toxic appearing  Pt in no acute distress and afebrile   4.   OTC for symptom management. Increase fluid intake, ensure adequate nutritional intake.  5.   Follow up with PCP as needed.  6.   Go to ED with development of any acute symptoms.     Follow up:  Return if symptoms worsen or fail to improve.  Follow up immediately for any new, worsening or changes or if symptoms are not improving over the next 5-7 days.     SUBJECTIVE/OBJECTIVE:    Headache  Generalized Body Aches  Associated symptoms: congestion, cough, ear pain, fatigue, fever, headaches, myalgias and rhinorrhea    Pharyngitis  Associated symptoms: congestion, cough, ear pain, fatigue, fever, headaches, myalgias and rhinorrhea    Fever   Associated symptoms include congestion, coughing, ear pain and headaches.          Headache (Pt c/o symptoms started yesterday morning.), Generalized Body Aches, Pharyngitis, and Fever        Results for orders placed or performed in visit on 25   POCT Influenza A/B Antigen   Result Value Ref Range    Inflenza A Ag negative     Influenza B Ag negative    POCT COVID-19, Antigen   Result Value Ref Range    SARS-COV-2, POC Detected (A) Not Detected    Lot Number [id number]     QC Pass/Fail pass     Performing Instrument BinaxNOW            Review of Systems   Constitutional:  Positive for fatigue and fever.   HENT:  Positive for congestion,

## 2025-05-27 ENCOUNTER — OFFICE VISIT (OUTPATIENT)
Age: 25
End: 2025-05-27

## 2025-05-27 VITALS
OXYGEN SATURATION: 98 % | DIASTOLIC BLOOD PRESSURE: 74 MMHG | TEMPERATURE: 98.9 F | HEIGHT: 64 IN | HEART RATE: 99 BPM | BODY MASS INDEX: 22.2 KG/M2 | WEIGHT: 130 LBS | SYSTOLIC BLOOD PRESSURE: 106 MMHG | RESPIRATION RATE: 18 BRPM

## 2025-05-27 DIAGNOSIS — H66.003 ACUTE SUPPURATIVE OTITIS MEDIA OF BOTH EARS WITHOUT SPONTANEOUS RUPTURE OF TYMPANIC MEMBRANES, RECURRENCE NOT SPECIFIED: Primary | ICD-10-CM

## 2025-05-27 RX ORDER — AMOXICILLIN 875 MG/1
875 TABLET, COATED ORAL 2 TIMES DAILY
Qty: 20 TABLET | Refills: 0 | Status: SHIPPED | OUTPATIENT
Start: 2025-05-27 | End: 2025-06-06

## 2025-05-27 ASSESSMENT — ENCOUNTER SYMPTOMS
RHINORRHEA: 1
SHORTNESS OF BREATH: 0
WHEEZING: 0
SINUS PRESSURE: 1
COUGH: 1
CHEST TIGHTNESS: 1

## 2025-05-27 NOTE — PROGRESS NOTES
2025   Sheryl Chang (: 2000) is a 25 y.o. female, established patient, here for evaluation of the following chief complaint(s):  Fever (Pt states that at home before she came her temperature was 100.3 with a non-touch thermometer. ) and Congestion (Pt states onset of sx yesterday. Pt states that she had a runny nose and her chest feels heavy. Pt states that she is taking Cold and Flu OTC and Tylenol. )       ASSESSMENT/PLAN:  Below is the assessment and plan developed based on review of pertinent history, physical exam, labs, studies, and medications.  1. Acute suppurative otitis media of both ears without spontaneous rupture of tympanic membranes, recurrence not specified       - amoxicillin      Handout given with care instructions  Pt with stable VS, non-toxic appearing  Pt in no acute distress and afebrile   4.   OTC for symptom management. Increase fluid intake, ensure adequate nutritional intake.  5.   Follow up with PCP as needed.  6.   Go to ED with development of any acute symptoms.     Follow up:  Return if symptoms worsen or fail to improve.  Follow up immediately for any new, worsening or changes or if symptoms are not improving over the next 5-7 days.     SUBJECTIVE/OBJECTIVE:  HPI       Fever (Pt states that at home before she came her temperature was 100.3 with a non-touch thermometer. ) and Congestion (Pt states onset of sx yesterday. Pt states that she had a runny nose and her chest feels heavy. Pt states that she is taking Cold and Flu OTC and Tylenol. )        Review of Systems   Constitutional:  Positive for chills, fatigue and fever.   HENT:  Positive for congestion, ear pain, rhinorrhea and sinus pressure. Negative for ear discharge.    Respiratory:  Positive for cough and chest tightness. Negative for shortness of breath and wheezing.    Cardiovascular: Negative.              Physical Exam  Constitutional:       Appearance: Normal appearance.   HENT:      Head: Normocephalic.

## 2025-07-30 ENCOUNTER — HOSPITAL ENCOUNTER (OUTPATIENT)
Facility: HOSPITAL | Age: 25
Setting detail: RECURRING SERIES
Discharge: HOME | End: 2025-08-02
Payer: COMMERCIAL

## 2025-07-30 PROCEDURE — 97535 SELF CARE MNGMENT TRAINING: CPT

## 2025-07-30 PROCEDURE — 97162 PT EVAL MOD COMPLEX 30 MIN: CPT

## 2025-07-30 NOTE — THERAPY EVALUATION
Physical Therapy at [place],   a part of [facility]  [address] [place] [address], [address]  [place] [address]  Phone: [phone]  Fax: [phone]     PHYSICAL THERAPY - EVALUATION/PLAN OF CARE NOTE (updated 3/23)    Date: 2025        Patient Name:  Sheryl Chang :  2000   Medical   Diagnosis:  Bilateral hip pain [M25.551, M25.552] Treatment Diagnosis:  M54.59  OTHER LOWER BACK PAIN  and M62.89  OTHER SPECIFIED DISORDERS OF MUSCLE    Referral Source:  Jaky Beavers MD Provider #:  [id number]                Insurance: Payor: [employer] / Plan: [employer] - CHOICE PLUS / Product Type: *No Product type* /      Patient  verified yes     Visit #   Current  / Total 1 12   Time   In / Out 850a 948a   Total Treatment Time 58   Total Timed Codes 28   1:1 Treatment Time 28    [facility] Totals Reminder:  bill using total billable   min of TIMED therapeutic procedures and modalities.   8-22 min = 1 unit; 23-37 min = 2 units; 38-52 min = 3 units;  53-67 min = 4 units; 68-82 min = 5 units     SUBJECTIVE  If an interpreting service was utilized for treatment of this patient, the contents of this document represent the material reviewed with the patient via the .     Pain Level (0-10 scale): 7 worst  []constant []intermittent []improving []worsening []no change since onset    Any medication changes, allergies to medications, adverse drug reactions, diagnosis change, or new procedure performed?: [x] No    [] Yes (see summary sheet for update)  Medications: Verified on Patient Summary List    Subjective functional status/changes:       Still using pediatric speculum for GYN exams - most recent was about 1 month ago. No residual burning. Still noting burning with insertion, localized at vulva. Changes with position. Lidocaine and cymbalta not helping. Works with dilator before intercourse- helps prepare. Up to IR size 4 (sweeping and X holds). No

## 2025-08-14 ENCOUNTER — HOSPITAL ENCOUNTER (OUTPATIENT)
Facility: HOSPITAL | Age: 25
Setting detail: RECURRING SERIES
Discharge: HOME | End: 2025-08-17
Payer: COMMERCIAL

## 2025-08-14 PROCEDURE — 97535 SELF CARE MNGMENT TRAINING: CPT

## 2025-08-14 PROCEDURE — 97140 MANUAL THERAPY 1/> REGIONS: CPT

## 2025-08-28 ENCOUNTER — HOSPITAL ENCOUNTER (OUTPATIENT)
Facility: HOSPITAL | Age: 25
Setting detail: RECURRING SERIES
Discharge: HOME | End: 2025-08-31
Payer: COMMERCIAL

## 2025-08-28 PROCEDURE — 97535 SELF CARE MNGMENT TRAINING: CPT

## 2025-08-28 PROCEDURE — 97140 MANUAL THERAPY 1/> REGIONS: CPT

## (undated) DEVICE — SYR 10ML LUER LOK 1/5ML GRAD --

## (undated) DEVICE — GAUZE,PACKING STRIP,PLAIN,1"X5YD,STRL,LF: Brand: CURAD

## (undated) DEVICE — TAPE,CLOTH/SILK,CURAD,3"X10YD,LF,40/CS: Brand: CURAD

## (undated) DEVICE — INFECTION CONTROL KIT SYS

## (undated) DEVICE — UNDERPANTS MAT L/XL KNIT SEAMLESS CLR CODE WAISTBAND

## (undated) DEVICE — DBD-PACK,LAPAROTOMY,2 REINFORCED GOWNS: Brand: MEDLINE

## (undated) DEVICE — GAUZE,PACKING STRIP,PLAIN,1/4"X5YD,STRL: Brand: CURAD

## (undated) DEVICE — SOL IRRIGATION INJ NACL 0.9% 500ML BTL

## (undated) DEVICE — DRESSING,GAUZE,XEROFORM,CURAD,1"X8",ST: Brand: CURAD

## (undated) DEVICE — TOWEL SURG W17XL27IN STD BLU COT NONFENESTRATED PREWASHED

## (undated) DEVICE — REM POLYHESIVE ADULT PATIENT RETURN ELECTRODE: Brand: VALLEYLAB

## (undated) DEVICE — PAD,ABDOMINAL,5"X9",ST,LF,25/BX: Brand: MEDLINE INDUSTRIES, INC.

## (undated) DEVICE — MASTISOL ADHESIVE LIQ 2/3ML

## (undated) DEVICE — SUTURE VCRL SZ 2-0 L18IN ABSRB VLT L26MM CT-2 1/2 CIR J726D

## (undated) DEVICE — STERILE POLYISOPRENE POWDER-FREE SURGICAL GLOVES: Brand: PROTEXIS

## (undated) DEVICE — STRAP,POSITIONING,KNEE/BODY,FOAM,4X60": Brand: MEDLINE

## (undated) DEVICE — SUPER SPONGES,MEDIUM: Brand: DERMACEA

## (undated) DEVICE — TELFA NON-ADHERENT ABSORBENT DRESSING: Brand: TELFA

## (undated) DEVICE — COVER LT HNDL PLAS RIG 1 PER PK

## (undated) DEVICE — SURGICAL PROCEDURE PACK BASIN MAJ SET CUST NO CAUT

## (undated) DEVICE — CATHETER IV 18GA L1.25IN FEP STR HUB INTROCAN SFTY

## (undated) DEVICE — POSITIONER HD REST FOAM CMFRT TCH

## (undated) DEVICE — ARGYLE FRAZIER SURGICAL SUCTION INSTRUMENT 10 FR/CH (3.3 MM): Brand: ARGYLE

## (undated) DEVICE — NEEDLE HYPO 22GA L1.5IN BLK S STL HUB POLYPR SHLD REG BVL

## (undated) DEVICE — SUTURE VCRL SZ 2-0 L27IN ABSRB UD L26MM SH 1/2 CIR J417H

## (undated) DEVICE — SUT ETHLN 3-0 18IN PS2 BLK --

## (undated) DEVICE — DEVICE TRNSF SPIK STL 2008S] MICROTEK MEDICAL INC]

## (undated) DEVICE — ROCKER SWITCH PENCIL BLADE ELECTRODE, HOLSTER: Brand: EDGE

## (undated) DEVICE — (D)PREP SKN CHLRAPRP APPL 26ML -- CONVERT TO ITEM 371833

## (undated) DEVICE — GAUZE,PACKING STRIP,IODOFORM,1/2"X5YD,ST: Brand: CURAD

## (undated) DEVICE — CONTAINER,SPECIMEN,3OZ,OR STRL: Brand: MEDLINE

## (undated) DEVICE — SPONGE GZ W4XL4IN COT 12 PLY TYP VII WVN C FLD DSGN